# Patient Record
Sex: FEMALE | Race: WHITE | NOT HISPANIC OR LATINO | URBAN - METROPOLITAN AREA
[De-identification: names, ages, dates, MRNs, and addresses within clinical notes are randomized per-mention and may not be internally consistent; named-entity substitution may affect disease eponyms.]

---

## 2021-09-02 ENCOUNTER — OFFICE (AMBULATORY)
Dept: URBAN - METROPOLITAN AREA PATHOLOGY 4 | Facility: PATHOLOGY | Age: 66
End: 2021-09-02
Payer: COMMERCIAL

## 2021-09-02 ENCOUNTER — OFFICE (AMBULATORY)
Dept: URBAN - METROPOLITAN AREA PATHOLOGY 4 | Facility: PATHOLOGY | Age: 66
End: 2021-09-02
Payer: MEDICARE

## 2021-09-02 ENCOUNTER — ON CAMPUS - OUTPATIENT (AMBULATORY)
Dept: URBAN - METROPOLITAN AREA HOSPITAL 2 | Facility: HOSPITAL | Age: 66
End: 2021-09-02
Payer: MEDICARE

## 2021-09-02 VITALS
DIASTOLIC BLOOD PRESSURE: 61 MMHG | RESPIRATION RATE: 18 BRPM | SYSTOLIC BLOOD PRESSURE: 114 MMHG | RESPIRATION RATE: 23 BRPM | HEART RATE: 88 BPM | HEART RATE: 76 BPM | DIASTOLIC BLOOD PRESSURE: 68 MMHG | RESPIRATION RATE: 16 BRPM | SYSTOLIC BLOOD PRESSURE: 113 MMHG | HEIGHT: 60 IN | HEART RATE: 74 BPM | HEART RATE: 71 BPM | DIASTOLIC BLOOD PRESSURE: 75 MMHG | OXYGEN SATURATION: 99 % | HEART RATE: 75 BPM | RESPIRATION RATE: 13 BRPM | HEART RATE: 69 BPM | HEART RATE: 66 BPM | DIASTOLIC BLOOD PRESSURE: 58 MMHG | HEART RATE: 80 BPM | SYSTOLIC BLOOD PRESSURE: 137 MMHG | SYSTOLIC BLOOD PRESSURE: 146 MMHG | WEIGHT: 234 LBS | RESPIRATION RATE: 17 BRPM | DIASTOLIC BLOOD PRESSURE: 70 MMHG | SYSTOLIC BLOOD PRESSURE: 126 MMHG | TEMPERATURE: 96.6 F | SYSTOLIC BLOOD PRESSURE: 116 MMHG | OXYGEN SATURATION: 95 % | OXYGEN SATURATION: 98 % | SYSTOLIC BLOOD PRESSURE: 139 MMHG | SYSTOLIC BLOOD PRESSURE: 117 MMHG | DIASTOLIC BLOOD PRESSURE: 67 MMHG | OXYGEN SATURATION: 100 % | SYSTOLIC BLOOD PRESSURE: 120 MMHG | DIASTOLIC BLOOD PRESSURE: 65 MMHG

## 2021-09-02 DIAGNOSIS — K31.89 OTHER DISEASES OF STOMACH AND DUODENUM: ICD-10-CM

## 2021-09-02 DIAGNOSIS — K64.8 OTHER HEMORRHOIDS: ICD-10-CM

## 2021-09-02 DIAGNOSIS — D50.0 IRON DEFICIENCY ANEMIA SECONDARY TO BLOOD LOSS (CHRONIC): ICD-10-CM

## 2021-09-02 DIAGNOSIS — K29.70 GASTRITIS, UNSPECIFIED, WITHOUT BLEEDING: ICD-10-CM

## 2021-09-02 DIAGNOSIS — D12.2 BENIGN NEOPLASM OF ASCENDING COLON: ICD-10-CM

## 2021-09-02 DIAGNOSIS — K29.80 DUODENITIS WITHOUT BLEEDING: ICD-10-CM

## 2021-09-02 PROBLEM — K63.5 POLYP OF COLON: Status: ACTIVE | Noted: 2021-09-02

## 2021-09-02 LAB
GI HISTOLOGY: A. UNSPECIFIED: (no result)
GI HISTOLOGY: B. SELECT: (no result)
GI HISTOLOGY: C. UNSPECIFIED: (no result)
GI HISTOLOGY: PDF REPORT: (no result)

## 2021-09-02 PROCEDURE — 43239 EGD BIOPSY SINGLE/MULTIPLE: CPT | Performed by: INTERNAL MEDICINE

## 2021-09-02 PROCEDURE — 45385 COLONOSCOPY W/LESION REMOVAL: CPT | Performed by: INTERNAL MEDICINE

## 2021-09-02 PROCEDURE — 88305 TISSUE EXAM BY PATHOLOGIST: CPT | Mod: 26 | Performed by: INTERNAL MEDICINE

## 2021-09-02 RX ORDER — PANTOPRAZOLE SODIUM 20 MG/1
20 TABLET, DELAYED RELEASE ORAL
Qty: 90 | Refills: 3 | Status: ACTIVE
Start: 2021-09-02

## 2021-09-03 PROBLEM — D50.0 IRON DEFICIENCY ANEMIA SECONDARY TO BLOOD LOSS (CHRONIC): Status: ACTIVE | Noted: 2021-09-02

## 2021-09-14 ENCOUNTER — OFFICE (AMBULATORY)
Dept: URBAN - METROPOLITAN AREA CLINIC 64 | Facility: CLINIC | Age: 66
End: 2021-09-14
Payer: MEDICARE

## 2021-09-14 DIAGNOSIS — K50.90 CROHN'S DISEASE, UNSPECIFIED, WITHOUT COMPLICATIONS: ICD-10-CM

## 2021-09-14 DIAGNOSIS — Z98.890 OTHER SPECIFIED POSTPROCEDURAL STATES: ICD-10-CM

## 2021-09-14 DIAGNOSIS — D50.9 IRON DEFICIENCY ANEMIA, UNSPECIFIED: ICD-10-CM

## 2021-09-14 DIAGNOSIS — K63.3 ULCER OF INTESTINE: ICD-10-CM

## 2021-09-14 PROCEDURE — 91110 GI TRC IMG INTRAL ESOPH-ILE: CPT | Performed by: INTERNAL MEDICINE

## 2022-12-28 NOTE — PROGRESS NOTES
Chief Complaint  Sleep Apnea    Subjective          Macey Chne presents to [facility] NEUROLOGY  History of Present Illness   New patient she sleeps with CPAP and gets supplies on line she wears nasal mask.   CPAP is at least 10 years old.  NPSG 1998    No problems with sleeping when on CPAP at 15. Nasal mask.   When she doesn't wear the cpap these questions apply:  The patient c/o daytime sleepiness issues:  excessive daytime sleepiness, .      There is no history of hypnagogic hallucinations, sleep paralysis or cataplexy.    The patient complains of snoring loud in all sleeping positions.    The patient complains of Leg symptoms: leg jerking during sleep.     The patient complains of problems with insomnia:  difficulty falling asleep.    The patient reports history of these childhood sleep problems: There is no h/O sleepwalking or bedwetting or nightmares or sleep eating or acting out dreams    Sleep schedule: Bedtime:10 , gets out of bed at 8, sleep latency: random, Gets about 8 hours of sleep.  Review of Systems   Constitutional: Negative.    HENT: Positive for postnasal drip, sinus pressure and sneezing.    Eyes: Negative.    Respiratory: Positive for apnea. Negative for shortness of breath.    Cardiovascular: Negative.    Endocrine: Negative.    Genitourinary: Negative.    Musculoskeletal: Negative.    Allergic/Immunologic: Positive for immunocompromised state.   Neurological: Positive for numbness. Negative for dizziness.   Hematological: Negative.    Psychiatric/Behavioral: Negative.        EPWORTH SLEEPINESS SCALE  Sitting and reading 0 WatchingTV 0  Sitting, inactive, in a public place 0  As a passenger in a car for 1 hour w/o a break  0  Lying down to rest in the afternoon  0  Sitting and talking to someone  0  Sitting quietly after a lunch  0  In a car, while stopped for traffic or a light  0  Total 0  Objective   Vital Signs:   /81 (BP Location: Right arm, Patient Position:  Sitting, Cuff Size: Adult)   Pulse 77   Temp 98.2 °F (36.8 °C)   Ht 149.9 cm (59\")   Wt 110 kg (243 lb)   BMI 49.08 kg/m²     Physical Exam  Vitals reviewed.   Constitutional:       Appearance: Normal appearance.   HENT:      Nose: Nose normal.      Mouth/Throat:      Mouth: Mucous membranes are moist.   Eyes:      Pupils: Pupils are equal, round, and reactive to light.   Cardiovascular:      Rate and Rhythm: Normal rate.      Pulses: Normal pulses.   Pulmonary:      Effort: Pulmonary effort is normal. No respiratory distress.   Musculoskeletal:         General: Normal range of motion.   Neurological:      General: No focal deficit present.      Mental Status: She is alert and oriented to person, place, and time.   Psychiatric:         Mood and Affect: Mood normal.        Result Review :                 Assessment and Plan    Diagnoses and all orders for this visit:    1. TALHA (obstructive sleep apnea) (Primary)    will evaluate with hst and then rx cpap.     Follow Up   Return in about 1 year (around 1/3/2024).  Patient was given instructions and counseling regarding her condition or for health maintenance advice. Please see specific information pulled into the AVS if appropriate.       This document has been electronically signed by Joseph Seipel, MD on January 3, 2023 17:00 EST

## 2023-01-03 ENCOUNTER — OFFICE VISIT (OUTPATIENT)
Dept: NEUROLOGY | Facility: CLINIC | Age: 68
End: 2023-01-03
Payer: MEDICARE

## 2023-01-03 VITALS
BODY MASS INDEX: 48.99 KG/M2 | WEIGHT: 243 LBS | HEART RATE: 77 BPM | DIASTOLIC BLOOD PRESSURE: 81 MMHG | SYSTOLIC BLOOD PRESSURE: 126 MMHG | TEMPERATURE: 98.2 F | HEIGHT: 59 IN

## 2023-01-03 DIAGNOSIS — G47.33 OSA (OBSTRUCTIVE SLEEP APNEA): Primary | ICD-10-CM

## 2023-01-03 PROBLEM — E53.8 VITAMIN B 12 DEFICIENCY: Status: ACTIVE | Noted: 2021-04-28

## 2023-01-03 PROBLEM — C79.9: Status: ACTIVE | Noted: 2018-04-30

## 2023-01-03 PROBLEM — C50.919: Status: ACTIVE | Noted: 2018-04-30

## 2023-01-03 PROBLEM — D50.9 IRON DEFICIENCY ANEMIA: Status: ACTIVE | Noted: 2018-11-13

## 2023-01-03 PROCEDURE — 1159F MED LIST DOCD IN RCRD: CPT | Performed by: PSYCHIATRY & NEUROLOGY

## 2023-01-03 PROCEDURE — 1160F RVW MEDS BY RX/DR IN RCRD: CPT | Performed by: PSYCHIATRY & NEUROLOGY

## 2023-01-03 PROCEDURE — 99204 OFFICE O/P NEW MOD 45 MIN: CPT | Performed by: PSYCHIATRY & NEUROLOGY

## 2023-01-03 RX ORDER — LEVOCETIRIZINE DIHYDROCHLORIDE 5 MG/1
5 TABLET, FILM COATED ORAL DAILY
COMMUNITY

## 2023-01-03 RX ORDER — TRASTUZUMAB 150 MG/7.4ML
INJECTION, POWDER, LYOPHILIZED, FOR SOLUTION INTRAVENOUS
COMMUNITY

## 2023-01-03 RX ORDER — MULTIVITAMIN WITH IRON
1 TABLET ORAL
COMMUNITY

## 2023-01-03 RX ORDER — LISINOPRIL 20 MG/1
TABLET ORAL
COMMUNITY
Start: 2022-11-22

## 2023-01-03 RX ORDER — TRAMADOL HYDROCHLORIDE 50 MG/1
50 TABLET ORAL
COMMUNITY
Start: 2022-06-22

## 2023-01-03 RX ORDER — HYDROCODONE BITARTRATE AND ACETAMINOPHEN 5; 325 MG/1; MG/1
TABLET ORAL
COMMUNITY
Start: 2022-10-08

## 2023-01-03 RX ORDER — PERTUZUMAB 30 MG/ML
INJECTION, SOLUTION, CONCENTRATE INTRAVENOUS ONCE
COMMUNITY

## 2023-01-03 RX ORDER — AMLODIPINE BESYLATE 5 MG/1
5 TABLET ORAL DAILY
COMMUNITY

## 2023-01-03 NOTE — LETTER
January 3, 2023     CONSTANZA Linares  [address] [place] [address]    Patient: Macey Chen   YOB: 1955   Date of Visit: 1/3/2023       Dear CONSTANZA Montero:    Thank you for referring Macey Chen to me for evaluation. Below are the relevant portions of my assessment and plan of care.    If you have questions, please do not hesitate to call me. I look forward to following Macey along with you.         Sincerely,        Joseph F Seipel, MD        CC: No Recipients  Seipel, Joseph F, MD  01/03/23 1701  Signed  Chief Complaint  Sleep Apnea    Subjective           Macey Chen presents to [facility]UROLOGY  History of Present Illness   New patient she sleeps with CPAP and gets supplies on line she wears nasal mask.   CPAP is at least 10 years old.  NPSG 1998    No problems with sleeping when on CPAP at 16. Nasal mask.   When she doesn't wear the cpap these questions apply:  The patient c/o daytime sleepiness issues:  excessive daytime sleepiness, .      There is no history of hypnagogic hallucinations, sleep paralysis or cataplexy.    The patient complains of snoring loud in all sleeping positions.    The patient complains of Leg symptoms: leg jerking during sleep.     The patient complains of problems with insomnia:  difficulty falling asleep.    The patient reports history of these childhood sleep problems: There is no h/O sleepwalking or bedwetting or nightmares or sleep eating or acting out dreams    Sleep schedule: Bedtime:10 , gets out of bed at 8, sleep latency: random, Gets about 8 hours of sleep.  Review of Systems   Constitutional: Negative.    HENT: Positive for postnasal drip, sinus pressure and sneezing.    Eyes: Negative.    Respiratory: Positive for apnea. Negative for shortness of breath.    Cardiovascular: Negative.    Endocrine: Negative.    Genitourinary: Negative.    Musculoskeletal: Negative.    Allergic/Immunologic: Positive  for immunocompromised state.   Neurological: Positive for numbness. Negative for dizziness.   Hematological: Negative.    Psychiatric/Behavioral: Negative.        EPWORTH SLEEPINESS SCALE  Sitting and reading 0 WatchingTV 0  Sitting, inactive, in a public place 0  As a passenger in a car for 1 hour w/o a break  0  Lying down to rest in the afternoon  0  Sitting and talking to someone  0  Sitting quietly after a lunch  0  In a car, while stopped for traffic or a light  0  Total 0  Objective    Vital Signs:   /81 (BP Location: Right arm, Patient Position: Sitting, Cuff Size: Adult)   Pulse 77   Temp 98.2 °F (36.8 °C)   Ht 149.9 cm (59\")   Wt 110 kg (243 lb)   BMI 49.08 kg/m²     Physical Exam  Vitals reviewed.   Constitutional:       Appearance: Normal appearance.   HENT:      Nose: Nose normal.      Mouth/Throat:      Mouth: Mucous membranes are moist.   Eyes:      Pupils: Pupils are equal, round, and reactive to light.   Cardiovascular:      Rate and Rhythm: Normal rate.      Pulses: Normal pulses.   Pulmonary:      Effort: Pulmonary effort is normal. No respiratory distress.   Musculoskeletal:         General: Normal range of motion.   Neurological:      General: No focal deficit present.      Mental Status: She is alert and oriented to person, place, and time.   Psychiatric:         Mood and Affect: Mood normal.        Result Review :                Assessment and Plan    Diagnoses and all orders for this visit:    1. TALHA (obstructive sleep apnea) (Primary)    will evaluate with hst and then rx cpap.     Follow Up   Return in about 1 year (around 1/3/2024).  Patient was given instructions and counseling regarding her condition or for health maintenance advice. Please see specific information pulled into the AVS if appropriate.       This document has been electronically signed by Joseph Seipel, MD on January 3, 2023 17:00 EST

## 2023-01-31 ENCOUNTER — HOSPITAL ENCOUNTER (OUTPATIENT)
Dept: SLEEP MEDICINE | Facility: HOSPITAL | Age: 68
Discharge: HOME | End: 2023-01-31
Admitting: PSYCHIATRY & NEUROLOGY
Payer: MEDICARE

## 2023-01-31 DIAGNOSIS — G47.33 OSA (OBSTRUCTIVE SLEEP APNEA): ICD-10-CM

## 2023-01-31 PROCEDURE — 95806 SLEEP STUDY UNATT&RESP EFFT: CPT

## 2023-01-31 PROCEDURE — 95806 SLEEP STUDY UNATT&RESP EFFT: CPT | Performed by: PSYCHIATRY & NEUROLOGY

## 2023-03-02 ENCOUNTER — TELEPHONE (OUTPATIENT)
Dept: NEUROLOGY | Facility: CLINIC | Age: 68
End: 2023-03-02

## 2023-03-02 NOTE — TELEPHONE ENCOUNTER
Provider: SEIPEL, JOSEPH, MD    Caller: JULIETTE    Relationship to Patient:  SLEEP [facility]    Phone Number: [phone]    Reason for Call: CALLING TO LET PROVIDER KNOW THAT PT IS CALLING THE SLEEP LAB THAT THEY DID NOT GET RESULTS OF THE HST.    PLEASE CALL PT & ADVISE

## 2023-03-03 NOTE — TELEPHONE ENCOUNTER
Caller: Macey Chen    Relationship: Self    Best call back number: [phone]    Caller requesting test results: PATIENT    What test was performed: HOME SLEEP STUDY    When was the test performed: 1/31/23    Where was the test performed: HOME    Additional notes: PATIENT WOULD LIKE TO KNOW THE RESULTS OF HER HOME SLEEP STUDY. PLEASE CALL HER.

## 2023-03-07 NOTE — TELEPHONE ENCOUNTER
Provider: JULIETTE  Caller:  [facility]  Phone Number: [phone]  Reason for Call: THE PATIENT HAS CALLED THEM AGAIN TO FIND SLEEP STUDY RESULTS AND THEY NEED TO KNOW IF HER PAP ORDER HAS BEEN MADE AND IF SHE HAS BEEN SCHEDULED FOR PAP.     PLEASE REVIEW AND ADVISE  THANK YOU

## 2023-03-10 NOTE — TELEPHONE ENCOUNTER
Caller: Macey Chen    Relationship: Self    Best call back number: [phone]    Caller requesting test results: PATIENT    What test was performed: SLEEP STUDY    When was the test performed: 1/31/23    Where was the test performed: PT HOME    PATIENT WANTS TO GET MACHINE ASAP.

## 2023-03-13 ENCOUNTER — TELEPHONE (OUTPATIENT)
Dept: NEUROLOGY | Facility: CLINIC | Age: 68
End: 2023-03-13
Payer: MEDICARE

## 2023-03-13 DIAGNOSIS — G47.33 OBSTRUCTIVE SLEEP APNEA: Primary | ICD-10-CM

## 2024-08-24 ENCOUNTER — HOSPITAL ENCOUNTER (OUTPATIENT)
Facility: HOSPITAL | Age: 69
Discharge: HOME | End: 2024-08-24
Attending: EMERGENCY MEDICINE
Payer: MEDICARE

## 2024-08-24 VITALS
OXYGEN SATURATION: 97 % | HEIGHT: 59 IN | SYSTOLIC BLOOD PRESSURE: 157 MMHG | WEIGHT: 238.3 LBS | RESPIRATION RATE: 20 BRPM | TEMPERATURE: 97.6 F | DIASTOLIC BLOOD PRESSURE: 77 MMHG | HEART RATE: 109 BPM | BODY MASS INDEX: 48.04 KG/M2

## 2024-08-24 DIAGNOSIS — R30.0 DYSURIA: Primary | ICD-10-CM

## 2024-08-24 LAB
BILIRUB UR QL STRIP: NEGATIVE
CLARITY UR: ABNORMAL
COLOR UR: YELLOW
GLUCOSE UR STRIP-MCNC: NEGATIVE MG/DL
HGB UR QL STRIP.AUTO: ABNORMAL
KETONES UR QL STRIP: NEGATIVE
LEUKOCYTE ESTERASE UR QL STRIP.AUTO: ABNORMAL
NITRITE UR QL STRIP: NEGATIVE
PH UR STRIP.AUTO: 6 [PH] (ref 5–8)
PROT UR QL STRIP: ABNORMAL
SP GR UR STRIP: 1.02 (ref 1–1.03)
UROBILINOGEN UR QL STRIP: ABNORMAL

## 2024-08-24 PROCEDURE — G0463 HOSPITAL OUTPT CLINIC VISIT: HCPCS | Performed by: EMERGENCY MEDICINE

## 2024-08-24 PROCEDURE — 99202 OFFICE O/P NEW SF 15 MIN: CPT | Performed by: EMERGENCY MEDICINE

## 2024-08-24 PROCEDURE — 81003 URINALYSIS AUTO W/O SCOPE: CPT

## 2024-08-24 RX ORDER — PHENAZOPYRIDINE HYDROCHLORIDE 100 MG/1
100 TABLET, FILM COATED ORAL 3 TIMES DAILY PRN
Qty: 6 TABLET | Refills: 0 | Status: SHIPPED | OUTPATIENT
Start: 2024-08-24 | End: 2024-08-26

## 2024-08-24 RX ORDER — CEFUROXIME AXETIL 500 MG/1
500 TABLET ORAL 2 TIMES DAILY
Qty: 14 TABLET | Refills: 0 | Status: SHIPPED | OUTPATIENT
Start: 2024-08-24 | End: 2024-08-31

## 2024-08-24 NOTE — FSED PROVIDER NOTE
Subjective   History of Present Illness  68-year-old female complains of dysuria since yesterday.  No fevers no chills feels fine otherwise.  Review of Systems   Genitourinary:  Positive for dysuria.       Past Medical History:   Diagnosis Date    Hypertension        Allergies   Allergen Reactions    Influenza Vaccine Live Anaphylaxis       Past Surgical History:   Procedure Laterality Date    KNEE SURGERY         Family History   Problem Relation Age of Onset    Stroke Maternal Grandmother        Social History     Socioeconomic History    Marital status: Single   Tobacco Use    Smoking status: Never   Vaping Use    Vaping status: Never Used   Substance and Sexual Activity    Alcohol use: Never    Drug use: Never    Sexual activity: Defer           Objective   Physical Exam  Nursing notes reviewed.  INITIAL VITAL SIGNS: Reviewed by me.  Pulse ox normal  GENERAL: Alert. Well developed and well nourished. No respiratory distress.  HEAD: Normocephalic.   EYES: No conjunctival injection.  ENT: Oral mucosa is moist.  NECK/BACK: Supple. Full range of motion.  RESPIRATORY: Non-labored respirations.  EXTREMITIES: No deformity.  SKIN: Warm and dry. No rashes. No diaphoresis.  NEUROLOGIC: Alert. Normal gait    Procedures           ED Course  ED Course as of 08/24/24 1028   Sat Aug 24, 2024   1026 Patient with dysuria no other symptoms feels fine no fevers.  Urinalysis could be consistent with UTI will treat and send for culture. [RO]      ED Course User Index  [RO] Adarsh Youssef MD                                           Medical Decision Making      Final diagnoses:   Dysuria       ED Disposition  ED Disposition       ED Disposition   Discharge    Condition   Good    Comment   --               Kaylah Henning, APRN  [address] [facility] [place] [address] [phone]    Call   To schedule follow-up appointment         Medication List        New Prescriptions      cefuroxime 500 MG tablet  Commonly known as:  CEFTIN  Take 1 tablet by mouth 2 (Two) Times a Day for 7 days.     phenazopyridine 100 MG tablet  Commonly known as: PYRIDIUM  Take 1 tablet by mouth 3 (Three) Times a Day As Needed for Bladder Spasms for up to 2 days.               Where to Get Your Medications        These medications were sent to [id number] - [place], [place] - [address] - [phone]  - [phone] FX  [address] [place] [address]      Phone: [phone]   cefuroxime 500 MG tablet  phenazopyridine 100 MG tablet